# Patient Record
(demographics unavailable — no encounter records)

---

## 2024-12-02 NOTE — HISTORY OF PRESENT ILLNESS
[FreeTextEntry1] : follow up and rx refill  [de-identified] : Mr. NADINE GUILLAUME is a 70 year-old male presents today for follow up and rx refill.

## 2024-12-02 NOTE — PHYSICAL EXAM
[No Acute Distress] : no acute distress [Well-Appearing] : well-appearing [No Respiratory Distress] : no respiratory distress  [No Accessory Muscle Use] : no accessory muscle use [Clear to Auscultation] : lungs were clear to auscultation bilaterally [Normal Rate] : normal rate  [Regular Rhythm] : with a regular rhythm [Normal S1, S2] : normal S1 and S2 [No Murmur] : no murmur heard [Normal Affect] : the affect was normal [Alert and Oriented x3] : oriented to person, place, and time [Normal Insight/Judgement] : insight and judgment were intact [No Edema] : there was no peripheral edema

## 2024-12-02 NOTE — ASSESSMENT
[FreeTextEntry1] : #Hypothyroidism  Stable on levothyroxine  #HLD stable on atorvastatin #T2DM last A1c 6.5  continue Metformin 500 mg BID      Counseling on risk factor modifications include maintenance of BP <140/90, A1C goal of < 7.0, exercise 40mins daily and low-fat, low cholesterol, low-salt diet. Heart healthy diet, high in fruits, vegetables, nuts, fish and low in red meat and carbohydrates was recommended. Furthermore, moderate level of exercise, 30-40 minutes daily, 5-7 days a week was encouraged. Fall precautions and medication compliance was also encouraged.  supervision

## 2024-12-02 NOTE — HISTORY OF PRESENT ILLNESS
[FreeTextEntry1] : follow up and rx refill  [de-identified] : Mr. NADINE GUILLAUME is a 70 year-old male presents today for follow up and rx refill.

## 2024-12-02 NOTE — HISTORY OF PRESENT ILLNESS
[FreeTextEntry1] : follow up and rx refill  [de-identified] : Mr. NADINE GUILLAUME is a 70 year-old male presents today for follow up and rx refill.

## 2024-12-02 NOTE — ASSESSMENT
[FreeTextEntry1] : #Hypothyroidism  Stable on levothyroxine  #HLD stable on atorvastatin #T2DM last A1c 6.5  continue Metformin 500 mg BID      Counseling on risk factor modifications include maintenance of BP <140/90, A1C goal of < 7.0, exercise 40mins daily and low-fat, low cholesterol, low-salt diet. Heart healthy diet, high in fruits, vegetables, nuts, fish and low in red meat and carbohydrates was recommended. Furthermore, moderate level of exercise, 30-40 minutes daily, 5-7 days a week was encouraged. Fall precautions and medication compliance was also encouraged.

## 2025-03-01 NOTE — HISTORY OF PRESENT ILLNESS
[FreeTextEntry1] : follow up  [de-identified] : Feb 28 2025 10:40AM   71 year old male returns for follow up appointment- Interim hx: ongoing nasal running - saw allergist  PMH: HLD, Hypothyroid, restless legs DM , b/l sensorineural hearing loss,  PSH: Tonsillectomy, dilation of urethral stricture, appendectomy,  Social Hx: rare alcohol use, former smoker    Allergies: NKDA  Meds: atorvastatin, lt4 50 metformin , montelukast ,  Supplements: vit D

## 2025-03-01 NOTE — HISTORY OF PRESENT ILLNESS
[FreeTextEntry1] : follow up  [de-identified] : Feb 28 2025 10:40AM   71 year old male returns for follow up appointment- Interim hx: ongoing nasal running - saw allergist  PMH: HLD, Hypothyroid, restless legs DM , b/l sensorineural hearing loss,  PSH: Tonsillectomy, dilation of urethral stricture, appendectomy,  Social Hx: rare alcohol use, former smoker    Allergies: NKDA  Meds: atorvastatin, lt4 50 metformin , montelukast ,  Supplements: vit D